# Patient Record
Sex: FEMALE | Race: WHITE | NOT HISPANIC OR LATINO | Employment: PART TIME | URBAN - NONMETROPOLITAN AREA
[De-identification: names, ages, dates, MRNs, and addresses within clinical notes are randomized per-mention and may not be internally consistent; named-entity substitution may affect disease eponyms.]

---
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
## 2025-01-14 ENCOUNTER — MYC MEDICAL ADVICE (OUTPATIENT)
Dept: FAMILY MEDICINE | Facility: OTHER | Age: 20
End: 2025-01-14
Payer: COMMERCIAL

## 2025-01-14 NOTE — TELEPHONE ENCOUNTER
Message below came in as CRM Request:  ===View-only below this line===      ----- Message -----       From:Kelly Phelps       Sent:1/11/2025  7:14 AM CST         To:Patient Customer Service Request Message List    Subject:Immunization     For my work I order to not wear a mask       ----- Message -----       From:Kelly Phelps       Sent:1/11/2025  7:12 AM CST         To:Customer Service    Subject:Immunization     Topic: Non-Medical Question.    Is there any way I can get a letter stating with my name on it that I got the flu vaccine. In my immunizations it doesn t state my name when looking at it.  Ty Alfaro RN on 1/14/2025 at 9:34 AM

## 2025-01-14 NOTE — TELEPHONE ENCOUNTER
Pt needs letter with her immunizations.     Pended note.     Routing to provider to review and respond.  Ty Alfaro RN on 1/14/2025 at 9:37 AM

## 2025-01-14 NOTE — LETTER
[facility]  [address]  [place] [address]  [phone]            1/14/2025      To Whom it May Concern:     Kelly Phelps, female, 2005, is a patient of mine and her immunizations are up to date:    Immunization History   Administered Date(s) Administered    DTAP (<7y) 04/26/2007    DTAP-IPV, <7Y (QUADRACEL/KINRIX) 10/17/2011    DTaP/HepB/IPV 02/14/2006, 04/17/2006, 06/12/2006    HEPA 12/21/2006, 01/14/2008    HEPATITIS A (PEDS 12M-18Y) 12/21/2006, 01/14/2008    HIB (PRP-T) 02/14/2006, 04/17/2006, 12/21/2006    HIB(PRP-OMP)(PedvaxHIB) 02/14/2006, 02/14/2006, 04/17/2006, 04/17/2006, 12/21/2006, 12/21/2006    HPV 09/17/2018    HPV9 12/11/2017, 09/17/2018    HepB 2005    Hepatitis B Not Indicated - By Hx 2005    Hepatitis B, Peds 2005    Influenza (prior to 2024) 11/03/2017    Influenza Intranasal Vaccine 12/04/2015    Influenza Vaccine >6 months,quad, PF 10/27/2016, 11/19/2019, 11/02/2020, 12/22/2022, 11/22/2023    Influenza Vaccine, 6+MO IM (QUADRIVALENT W/PRESERVATIVES) 10/27/2016    Influenza, Split Virus, Trivalent, Pf (Fluzone\Fluarix) 12/13/2024    MENINGOCOCCAL ACWY (MENQUADFI ) 12/22/2022    MMR 12/21/2006, 10/17/2011    MMR/V 12/21/2006    Meningococcal ACWY (Menactra ) 12/11/2017    Nasal Influenza Vaccine 2-49 (FluMist) 12/04/2015    Pneumococcal (PCV 7) 02/14/2006, 04/17/2006, 06/12/2006, 04/26/2007    Pneumococcal, Unspecified 02/14/2006, 04/17/2006, 06/12/2006, 04/26/2007    TDAP Vaccine (Adacel) 12/11/2017    TDAP Vaccine (Boostrix) 12/11/2017    Varicella 12/21/2006, 10/17/2011       Please contact me for questions or concerns at [phone].    Sincerely,        Leilani Kang PA-C

## 2025-01-14 NOTE — TELEPHONE ENCOUNTER
[facility]  Leilani Kang PA-C on 1/14/2025 at 9:56 AM

## 2025-01-28 ENCOUNTER — OFFICE VISIT (OUTPATIENT)
Dept: PSYCHIATRY | Facility: OTHER | Age: 20
End: 2025-01-28
Attending: PSYCHIATRY & NEUROLOGY

## 2025-01-28 VITALS
TEMPERATURE: 98.4 F | DIASTOLIC BLOOD PRESSURE: 79 MMHG | SYSTOLIC BLOOD PRESSURE: 119 MMHG | WEIGHT: 191 LBS | BODY MASS INDEX: 33.84 KG/M2 | OXYGEN SATURATION: 98 % | HEIGHT: 63 IN | HEART RATE: 78 BPM | RESPIRATION RATE: 20 BRPM

## 2025-01-28 DIAGNOSIS — F90.9 ATTENTION DEFICIT HYPERACTIVITY DISORDER (ADHD), UNSPECIFIED ADHD TYPE: Primary | ICD-10-CM

## 2025-01-28 PROCEDURE — G0463 HOSPITAL OUTPT CLINIC VISIT: HCPCS

## 2025-01-28 RX ORDER — DEXTROAMPHETAMINE SACCHARATE, AMPHETAMINE ASPARTATE, DEXTROAMPHETAMINE SULFATE AND AMPHETAMINE SULFATE 5; 5; 5; 5 MG/1; MG/1; MG/1; MG/1
20 TABLET ORAL 2 TIMES DAILY
Qty: 60 TABLET | Refills: 0 | Status: SHIPPED | OUTPATIENT
Start: 2025-02-27 | End: 2025-03-29

## 2025-01-28 RX ORDER — DEXTROAMPHETAMINE SACCHARATE, AMPHETAMINE ASPARTATE, DEXTROAMPHETAMINE SULFATE AND AMPHETAMINE SULFATE 5; 5; 5; 5 MG/1; MG/1; MG/1; MG/1
20 TABLET ORAL 2 TIMES DAILY
Qty: 60 TABLET | Refills: 0 | Status: SHIPPED | OUTPATIENT
Start: 2025-03-29 | End: 2025-04-28

## 2025-01-28 RX ORDER — DEXTROAMPHETAMINE SACCHARATE, AMPHETAMINE ASPARTATE, DEXTROAMPHETAMINE SULFATE AND AMPHETAMINE SULFATE 5; 5; 5; 5 MG/1; MG/1; MG/1; MG/1
20 TABLET ORAL 2 TIMES DAILY
Qty: 60 TABLET | Refills: 0 | Status: SHIPPED | OUTPATIENT
Start: 2025-01-28 | End: 2025-02-27

## 2025-01-28 ASSESSMENT — ANXIETY QUESTIONNAIRES
GAD7 TOTAL SCORE: 1
3. WORRYING TOO MUCH ABOUT DIFFERENT THINGS: NOT AT ALL
1. FEELING NERVOUS, ANXIOUS, OR ON EDGE: SEVERAL DAYS
7. FEELING AFRAID AS IF SOMETHING AWFUL MIGHT HAPPEN: NOT AT ALL
IF YOU CHECKED OFF ANY PROBLEMS ON THIS QUESTIONNAIRE, HOW DIFFICULT HAVE THESE PROBLEMS MADE IT FOR YOU TO DO YOUR WORK, TAKE CARE OF THINGS AT HOME, OR GET ALONG WITH OTHER PEOPLE: NOT DIFFICULT AT ALL
6. BECOMING EASILY ANNOYED OR IRRITABLE: NOT AT ALL
GAD7 TOTAL SCORE: 1
8. IF YOU CHECKED OFF ANY PROBLEMS, HOW DIFFICULT HAVE THESE MADE IT FOR YOU TO DO YOUR WORK, TAKE CARE OF THINGS AT HOME, OR GET ALONG WITH OTHER PEOPLE?: NOT DIFFICULT AT ALL
4. TROUBLE RELAXING: NOT AT ALL
5. BEING SO RESTLESS THAT IT IS HARD TO SIT STILL: NOT AT ALL
GAD7 TOTAL SCORE: 1
7. FEELING AFRAID AS IF SOMETHING AWFUL MIGHT HAPPEN: NOT AT ALL
2. NOT BEING ABLE TO STOP OR CONTROL WORRYING: NOT AT ALL

## 2025-01-28 ASSESSMENT — PATIENT HEALTH QUESTIONNAIRE - PHQ9
10. IF YOU CHECKED OFF ANY PROBLEMS, HOW DIFFICULT HAVE THESE PROBLEMS MADE IT FOR YOU TO DO YOUR WORK, TAKE CARE OF THINGS AT HOME, OR GET ALONG WITH OTHER PEOPLE: NOT DIFFICULT AT ALL
SUM OF ALL RESPONSES TO PHQ QUESTIONS 1-9: 2
SUM OF ALL RESPONSES TO PHQ QUESTIONS 1-9: 2

## 2025-01-28 ASSESSMENT — PAIN SCALES - GENERAL: PAINLEVEL_OUTOF10: NO PAIN (0)

## 2025-01-28 NOTE — PROGRESS NOTES
Psychiatric Progress Note/Visit  January 28, 2025    Identifying Data:  This is a 19-year-old woman seen for psychiatric medication management treatment of depression, anxiety and ADHD    Interval History:  She was seen most recently on October 28, 2024.  At that time she reported that the Adderall was very effective and she used it once a day or twice a day or sometimes she did not need to use it.  She found that it was effective no matter how she used it.  She reported that she had increased her Zoloft to 75 mg a day and noted that anxiety had decreased significantly.  We did not make any medication changes.    Today she reports that she continues to do well, going to school and working.  She reports that 20 mg of Adderall at a time seems to be the most effective dosage for her.    Mental Status Exam:  Anxiety continues to decrease and is considered minimal to absent.  Remainder of MSE is essentially unchanged from October 28, 2024.    Current Psychiatric Medications:  Immediate release Adderall 20 mg once or twice a day (increased dosage from 10 mg starting today)  Zoloft 75 mg every night  Lamictal 200 mg every night  Hydroxyzine tablets 25 to 50 mg as needed up to 3 times a day    Lab Tests/Results:  Laboratory studies were not ordered previously and none are being ordered today.    Diagnosis:  Depression, unspecified  Anxiety, unspecified  ADHD, unspecified  Rule out mood disorder    Impression/Assessment:  Kelly continues to do very well on her current combination of psychiatric medications.  We discussed increasing her regular dosage of immediate release Adderall to 20 mg once or twice a day.    Plan:  Increase immediate release Adderall to 20 mg once or twice a day for ADHD  Continue Zoloft 75 mg every night for depression and anxiety  Continue Lamictal 200 mg every night for depression  Continue individual psychotherapy  Follow-up Dr. Larios in 3 months    Time spent on day of visit 35 minutes - 7  minutes (12:59 PM through 1:06 PM) review of EMR prior to visit, 20 minutes (3:32 PM through 3:52 PM) face-to-face meeting with patient, including discussion of risk/benefits, alternatives and possible side effects to medication, counseling on above issues, and prescription of medications in the EMR, 8 minutes (3:52 PM through 4 PM) documentation in the EMR      Jose Larios MD    Answers submitted by the patient for this visit:  Patient Health Questionnaire (Submitted on 1/28/2025)  If you checked off any problems, how difficult have these problems made it for you to do your work, take care of things at home, or get along with other people?: Not difficult at all  PHQ9 TOTAL SCORE: 2  Patient Health Questionnaire (G7) (Submitted on 1/28/2025)  LOYD 7 TOTAL SCORE: 1

## 2025-01-28 NOTE — NURSING NOTE
"Chief Complaint   Patient presents with    Recheck Medication       Initial /79 (BP Location: Right arm, Patient Position: Sitting, Cuff Size: Adult Large)   Pulse 78   Temp 98.4  F (36.9  C) (Tympanic)   Resp 20   Ht 1.6 m (5' 3\")   Wt 86.6 kg (191 lb)   SpO2 98%   BMI 33.83 kg/m   Estimated body mass index is 33.83 kg/m  as calculated from the following:    Height as of this encounter: 1.6 m (5' 3\").    Weight as of this encounter: 86.6 kg (191 lb).  Medication Review: complete    The next two questions are to help us understand your food security.  If you are feeling you need any assistance in this area, we have resources available to support you today.          1/28/2025   SDOH- Food Insecurity   Within the past 12 months, did you worry that your food would run out before you got money to buy more? N   Within the past 12 months, did the food you bought just not last and you didn t have money to get more? N             Cristiano Martinez      "

## 2025-03-03 ENCOUNTER — MYC MEDICAL ADVICE (OUTPATIENT)
Dept: FAMILY MEDICINE | Facility: OTHER | Age: 20
End: 2025-03-03

## 2025-03-03 NOTE — TELEPHONE ENCOUNTER
Message below came in as CRM Request:    ----- Message -----       From:Kelly Phelps       Sent:3/2/2025  7:47 PM CST         To:Customer Service    Subject:Upcoming appointment     Topic: Non-Medical Question.    How can i cancel an appointment without calling.     Ty Alfaro RN on 3/3/2025 at 2:25 PM

## 2025-03-09 ENCOUNTER — HEALTH MAINTENANCE LETTER (OUTPATIENT)
Age: 20
End: 2025-03-09

## 2025-04-07 ENCOUNTER — HOSPITAL ENCOUNTER (OUTPATIENT)
Dept: GENERAL RADIOLOGY | Facility: OTHER | Age: 20
Discharge: HOME | End: 2025-04-07
Attending: NURSE PRACTITIONER

## 2025-04-07 ENCOUNTER — OFFICE VISIT (OUTPATIENT)
Dept: FAMILY MEDICINE | Facility: OTHER | Age: 20
End: 2025-04-07
Attending: STUDENT IN AN ORGANIZED HEALTH CARE EDUCATION/TRAINING PROGRAM

## 2025-04-07 VITALS
DIASTOLIC BLOOD PRESSURE: 78 MMHG | WEIGHT: 186.6 LBS | SYSTOLIC BLOOD PRESSURE: 132 MMHG | TEMPERATURE: 96.9 F | RESPIRATION RATE: 18 BRPM | HEART RATE: 63 BPM | OXYGEN SATURATION: 100 % | BODY MASS INDEX: 33.06 KG/M2 | HEIGHT: 63 IN
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
## 2025-04-07 DIAGNOSIS — M25.531 RIGHT WRIST PAIN: Primary | ICD-10-CM

## 2025-04-07 PROCEDURE — 73110 X-RAY EXAM OF WRIST: CPT | Mod: RT

## 2025-04-07 PROCEDURE — 99213 OFFICE O/P EST LOW 20 MIN: CPT | Performed by: NURSE PRACTITIONER

## 2025-04-07 ASSESSMENT — ENCOUNTER SYMPTOMS
CONSTITUTIONAL NEGATIVE: 1
EYES NEGATIVE: 1
ENDOCRINE NEGATIVE: 1
RESPIRATORY NEGATIVE: 1
JOINT SWELLING: 1
NEUROLOGICAL NEGATIVE: 1
CARDIOVASCULAR NEGATIVE: 1
HEMATOLOGIC/LYMPHATIC NEGATIVE: 1
PSYCHIATRIC NEGATIVE: 1
GASTROINTESTINAL NEGATIVE: 1

## 2025-04-07 ASSESSMENT — PAIN SCALES - GENERAL: PAINLEVEL_OUTOF10: MODERATE PAIN (6)

## 2025-04-07 NOTE — NURSING NOTE
"Chief Complaint   Patient presents with    Wrist Injury     Right, fell this am   Patient presents to the rapid clinic today for concerns of a right wrist injury. Patient states she fell this morning on her wrist and has been hurting since.     Initial /78 (BP Location: Right arm, Patient Position: Sitting, Cuff Size: Adult Regular)   Pulse 63   Temp 96.9  F (36.1  C) (Temporal)   Resp 18   Ht 1.6 m (5' 3\")   Wt 84.6 kg (186 lb 9.6 oz)   SpO2 100%   BMI 33.05 kg/m   Estimated body mass index is 33.05 kg/m  as calculated from the following:    Height as of this encounter: 1.6 m (5' 3\").    Weight as of this encounter: 84.6 kg (186 lb 9.6 oz).  Medication Review: complete    The next two questions are to help us understand your food security.  If you are feeling you need any assistance in this area, we have resources available to support you today.          4/7/2025   SDOH- Food Insecurity   Within the past 12 months, did you worry that your food would run out before you got money to buy more? N   Within the past 12 months, did the food you bought just not last and you didn t have money to get more? N         Health Care Directive:  Patient does not have a Health Care Directive: Discussed advance care planning with patient; however, patient declined at this time.    Yoan Andrews      "

## 2025-04-07 NOTE — PROGRESS NOTES
Kelly Phelps  2005    ASSESSMENT/PLAN      Presents to [facility] clinic with right wrist pain after fall this morning.  Patient states she slipped and fell this morning landing on her right wrist.  Patient has had no numbness or tingling.  Patient does have range of motion with increased pain.  X-ray obtained and shows no acute fracture or abnormality.  Patient's vitals are stable and she appears nontoxic.        1. Right wrist pain (Primary)  2. Fall, initial encounter    - XR Wrist Right G/E 3 Views  - RICE  - Ace wrap applied in clinic  - May use over-the-counter Tylenol or ibuprofen PRN  - Follow up as needed for new or worsening symptoms        *A shared decision making model was used.   *Patient and/or associated parties understood and were agreeable to treatment plan.   *Plan and all results were discussed.   *Explanation of diagnosis, treatment options and risk and benefits of medications reviewed with patient.    *Time was taken to answer all questions.   *Red flags symptoms were discussed and patient was advised when they should return for reevaluation or for prompt emergency evaluation.   *Patient was given verbal and written instructions on plan of care. Instructions were printed or are available on Mychart on electronic AVS.   *We discussed potential side effects of any prescribed or recommended therapies, as well as expectations for response to treatments.  *Patient discharged in stable condition    Karissa Sandoval CNP  [facility]    SUBJECTIVE  CHIEF COMPLAINT/ REASON FOR VISIT  Patient presents with:  Wrist Injury: Right, fell this am     HISTORY OF PRESENT ILLNESS  Kelly Phelps is a pleasant 19 year old female presents to rapid clinic today with right wrist pain after fall this morning.  Patient states she slipped and fell this morning landing on her right wrist.  Patient has had no numbness or tingling.  Patient does have range of motion with increased pain.       I  "have reviewed the nursing notes.  I have reviewed allergies, medication list, problem list, and past medical history.    REVIEW OF SYSTEMS  Review of Systems   Constitutional: Negative.    HENT: Negative.     Eyes: Negative.    Respiratory: Negative.     Cardiovascular: Negative.    Gastrointestinal: Negative.    Endocrine: Negative.    Genitourinary: Negative.    Musculoskeletal:  Positive for joint swelling.   Skin: Negative.    Neurological: Negative.    Hematological: Negative.    Psychiatric/Behavioral: Negative.     All other systems reviewed and are negative.       VITAL SIGNS  Vitals:    04/07/25 1220   BP: 132/78   BP Location: Right arm   Patient Position: Sitting   Cuff Size: Adult Regular   Pulse: 63   Resp: 18   Temp: 96.9  F (36.1  C)   TempSrc: Temporal   SpO2: 100%   Weight: 84.6 kg (186 lb 9.6 oz)   Height: 1.6 m (5' 3\")      Body mass index is 33.05 kg/m .      OBJECTIVE  PHYSICAL EXAM  Physical Exam  Vitals and nursing note reviewed.   Constitutional:       Appearance: Normal appearance.   HENT:      Head: Normocephalic.      Nose: Nose normal.      Mouth/Throat:      Mouth: Mucous membranes are moist.   Cardiovascular:      Rate and Rhythm: Normal rate.      Pulses: Normal pulses.   Pulmonary:      Effort: Pulmonary effort is normal.   Abdominal:      Palpations: Abdomen is soft.   Musculoskeletal:         General: Tenderness present. No swelling, deformity or signs of injury. Normal range of motion.   Skin:     General: Skin is warm and dry.      Capillary Refill: Capillary refill takes less than 2 seconds.   Neurological:      General: No focal deficit present.      Mental Status: She is alert.            DIAGNOSTICS  Results for orders placed or performed in visit on 04/07/25   XR Wrist Right G/E 3 Views     Status: None    Narrative    PROCEDURE:  XR WRIST RIGHT G/E 3 VIEWS    HISTORY: Right wrist pain; Fall, initial encounter    COMPARISON: No relevant priors available for " comparison    TECHNIQUE:  XR WRIST RIGHT 3 VIEWS    FINDINGS:    No acute fracture or dislocation is identified. No suspicious osseous  lesion. The joint spaces are preserved. Maturation and mineralization  is within normal limits.     No foreign body or subcutaneous emphysema.       Impression    IMPRESSION:    No acute osseous abnormality.    HETAL SUAREZ MD         SYSTEM ID:  [id number]

## 2025-05-19 ENCOUNTER — MYC REFILL (OUTPATIENT)
Dept: FAMILY MEDICINE | Facility: OTHER | Age: 20
End: 2025-05-19

## 2025-05-19 ENCOUNTER — MYC REFILL (OUTPATIENT)
Dept: PSYCHIATRY | Facility: OTHER | Age: 20
End: 2025-05-19

## 2025-05-19 DIAGNOSIS — L30.1 DYSHIDROTIC ECZEMA: ICD-10-CM

## 2025-05-19 DIAGNOSIS — F41.9 MILD ANXIETY: ICD-10-CM

## 2025-05-19 DIAGNOSIS — F33.0 MILD RECURRENT MAJOR DEPRESSION: ICD-10-CM

## 2025-05-19 DIAGNOSIS — L30.9 ECZEMA, UNSPECIFIED TYPE: ICD-10-CM

## 2025-05-19 RX ORDER — LAMOTRIGINE 200 MG/1
200 TABLET ORAL AT BEDTIME
Qty: 30 TABLET | Refills: 0 | Status: SHIPPED | OUTPATIENT
Start: 2025-05-19

## 2025-05-22 RX ORDER — TRIAMCINOLONE ACETONIDE 1 MG/G
CREAM TOPICAL 2 TIMES DAILY
Qty: 80 G | Refills: 0 | Status: SHIPPED | OUTPATIENT
Start: 2025-05-22

## 2025-05-22 NOTE — TELEPHONE ENCOUNTER
GI sent Rx request for the following:      Requested Prescriptions   Pending Prescriptions Disp Refills    triamcinolone (KENALOG) 0.1 % external cream 80 g 1     Sig: Apply topically 2 times daily.       Topical Steroids and Nonsteroidals Protocol Passed - 5/22/2025  7:13 AM       Last Prescription Date:   7/23/24  Last Fill Qty/Refills:         80 g, R-1    Last Office Visit:              1/8/24 diagnosis last addressed   Future Office visit:              Will route to unit 4 scheduling to call patient and help assist in scheduling appointment.  Patient due for annual review  Routing refill request to provider for review/approval   Yasmin Golden RN on 5/22/2025 at 7:17 AM

## 2025-06-03 ENCOUNTER — PATIENT OUTREACH (OUTPATIENT)
Dept: FAMILY MEDICINE | Facility: OTHER | Age: 20
End: 2025-06-03

## 2025-06-03 NOTE — LETTER
[facility]  [address]  [place] [id number] [phone]       Nicolette 3, 2025    Kelly Phelps  [address] [address] [address]    Dear Kelly,    We care about your health and have reviewed your health plan and are making recommendations based on this review, to optimize your health.    You are in particular need of attention regarding:  -Wellness (Physical) Visit     We are recommending that you:  -schedule a WELLNESS (Physical) APPOINTMENT with me.        In addition, here is a list of due or overdue Health Maintenance reminders.    Health Maintenance Due   Topic Date Due    Depression Action Plan  Never done    Meningitis B Vaccine (1 of 2 - Standard) Never done    Yearly Preventive Visit  12/22/2023    Chlamydia Screening  08/24/2024    COVID-19 Vaccine (1 - 2024-25 season) Never done       To address the above recommendations, we encourage you to contact us at [phone]. They will assist you with finding the most convenient time and location.    Thank you for trusting [facility] and we appreciate the opportunity to serve you.  We look forward to supporting your healthcare needs in the future.    Healthy Regards,    Your [facility] Team

## 2025-06-03 NOTE — TELEPHONE ENCOUNTER
Patient Quality Outreach    Patient is due for the following:   Physical Preventive Adult Physical    Action(s) Taken:   Schedule a Adult Preventative    Type of outreach:    Sent letter.    Questions for provider review:    None         Sean Mendez  Chart routed to None.

## 2025-07-05 ENCOUNTER — MYC REFILL (OUTPATIENT)
Dept: PSYCHIATRY | Facility: OTHER | Age: 20
End: 2025-07-05

## 2025-07-05 DIAGNOSIS — F33.0 MILD RECURRENT MAJOR DEPRESSION: ICD-10-CM

## 2025-07-05 DIAGNOSIS — F41.9 MILD ANXIETY: ICD-10-CM

## 2025-07-07 RX ORDER — LAMOTRIGINE 200 MG/1
200 TABLET ORAL AT BEDTIME
Qty: 15 TABLET | Refills: 1 | Status: SHIPPED | OUTPATIENT
Start: 2025-07-07

## 2025-07-23 ENCOUNTER — VIRTUAL VISIT (OUTPATIENT)
Dept: PSYCHIATRY | Facility: OTHER | Age: 20
End: 2025-07-23
Attending: PSYCHIATRY & NEUROLOGY

## 2025-07-23 DIAGNOSIS — F90.9 ATTENTION DEFICIT HYPERACTIVITY DISORDER (ADHD), UNSPECIFIED ADHD TYPE: Primary | ICD-10-CM

## 2025-07-23 DIAGNOSIS — F41.9 MILD ANXIETY: ICD-10-CM

## 2025-07-23 DIAGNOSIS — F33.0 MILD RECURRENT MAJOR DEPRESSION: ICD-10-CM

## 2025-07-23 RX ORDER — DEXTROAMPHETAMINE SACCHARATE, AMPHETAMINE ASPARTATE, DEXTROAMPHETAMINE SULFATE AND AMPHETAMINE SULFATE 5; 5; 5; 5 MG/1; MG/1; MG/1; MG/1
20 TABLET ORAL 2 TIMES DAILY
Qty: 60 TABLET | Refills: 0 | Status: SHIPPED | OUTPATIENT
Start: 2025-07-23 | End: 2025-08-22

## 2025-07-23 RX ORDER — DEXTROAMPHETAMINE SACCHARATE, AMPHETAMINE ASPARTATE, DEXTROAMPHETAMINE SULFATE AND AMPHETAMINE SULFATE 5; 5; 5; 5 MG/1; MG/1; MG/1; MG/1
20 TABLET ORAL 2 TIMES DAILY
Qty: 60 TABLET | Refills: 0 | Status: SHIPPED | OUTPATIENT
Start: 2025-08-22 | End: 2025-09-21

## 2025-07-23 RX ORDER — HYDROXYZINE HYDROCHLORIDE 10 MG/1
20 TABLET, FILM COATED ORAL 3 TIMES DAILY PRN
Qty: 180 TABLET | Refills: 1 | Status: SHIPPED | OUTPATIENT
Start: 2025-07-23

## 2025-07-23 RX ORDER — LAMOTRIGINE 200 MG/1
200 TABLET ORAL AT BEDTIME
Qty: 90 TABLET | Refills: 1 | Status: SHIPPED | OUTPATIENT
Start: 2025-07-23

## 2025-07-23 RX ORDER — DEXTROAMPHETAMINE SACCHARATE, AMPHETAMINE ASPARTATE, DEXTROAMPHETAMINE SULFATE AND AMPHETAMINE SULFATE 5; 5; 5; 5 MG/1; MG/1; MG/1; MG/1
20 TABLET ORAL 2 TIMES DAILY
Qty: 60 TABLET | Refills: 0 | Status: SHIPPED | OUTPATIENT
Start: 2025-09-21 | End: 2025-10-21

## 2025-07-23 ASSESSMENT — ANXIETY QUESTIONNAIRES
7. FEELING AFRAID AS IF SOMETHING AWFUL MIGHT HAPPEN: NOT AT ALL
8. IF YOU CHECKED OFF ANY PROBLEMS, HOW DIFFICULT HAVE THESE MADE IT FOR YOU TO DO YOUR WORK, TAKE CARE OF THINGS AT HOME, OR GET ALONG WITH OTHER PEOPLE?: NOT DIFFICULT AT ALL
4. TROUBLE RELAXING: SEVERAL DAYS
6. BECOMING EASILY ANNOYED OR IRRITABLE: SEVERAL DAYS
2. NOT BEING ABLE TO STOP OR CONTROL WORRYING: NOT AT ALL
7. FEELING AFRAID AS IF SOMETHING AWFUL MIGHT HAPPEN: NOT AT ALL
GAD7 TOTAL SCORE: 3
GAD7 TOTAL SCORE: 3
1. FEELING NERVOUS, ANXIOUS, OR ON EDGE: SEVERAL DAYS
IF YOU CHECKED OFF ANY PROBLEMS ON THIS QUESTIONNAIRE, HOW DIFFICULT HAVE THESE PROBLEMS MADE IT FOR YOU TO DO YOUR WORK, TAKE CARE OF THINGS AT HOME, OR GET ALONG WITH OTHER PEOPLE: NOT DIFFICULT AT ALL
5. BEING SO RESTLESS THAT IT IS HARD TO SIT STILL: NOT AT ALL
GAD7 TOTAL SCORE: 3
3. WORRYING TOO MUCH ABOUT DIFFERENT THINGS: NOT AT ALL

## 2025-07-23 ASSESSMENT — PATIENT HEALTH QUESTIONNAIRE - PHQ9
SUM OF ALL RESPONSES TO PHQ QUESTIONS 1-9: 3
10. IF YOU CHECKED OFF ANY PROBLEMS, HOW DIFFICULT HAVE THESE PROBLEMS MADE IT FOR YOU TO DO YOUR WORK, TAKE CARE OF THINGS AT HOME, OR GET ALONG WITH OTHER PEOPLE: NOT DIFFICULT AT ALL
SUM OF ALL RESPONSES TO PHQ QUESTIONS 1-9: 3

## 2025-07-23 NOTE — NURSING NOTE
"Chief Complaint   Patient presents with    Medication Therapy Management     Patient reports taking the following medication that was not in their medication list: dextroamphetamine 20mg.    Initial There were no vitals taken for this visit. Estimated body mass index is 33.05 kg/m  as calculated from the following:    Height as of 4/7/25: 1.6 m (5' 3\").    Weight as of 4/7/25: 84.6 kg (186 lb 9.6 oz).  Medication Review: complete    The next two questions are to help us understand your food security.  If you are feeling you need any assistance in this area, we have resources available to support you today.          7/23/2025   SDOH- Food Insecurity   Within the past 12 months, did you worry that your food would run out before you got money to buy more? N   Within the past 12 months, did the food you bought just not last and you didn t have money to get more? N           Cristiano Martinez      "

## 2025-07-23 NOTE — PROGRESS NOTES
Psychiatric Progress Note/Visit  July 23, 2025    Identifying Data:  This is a 19-year-old woman seen virtually for psychiatric medication management treatment of depression, anxiety and ADHD    Interval History:  She was seen most recently on January 28, 2025.  At that time she reported she was doing well and ambulating in school and work.  She noted that 20 mg of Adderall at bedtime seem to be the most effective dosage for her.  We did not make any medication changes.    Today she reports that she continues to do well on her current medications.  She does note that when she uses as needed hydroxyzine she will feel tired the next day if she takes it the night before.  We discussed a trial of a lower dosage, 10-20 mg at bedtime, to try to minimize sedative side effects.  She continues to do well with her schooling and work and reports she only has 1 year left of her college studies.  She says that she is now thinking about going into counseling and I supported that and encouraged her.  She reports that she continues to benefit from her individual psychotherapy sessions and has been able to decrease the frequency to once a month.    [place] PDMP reviewed today  Controlled substance use agreement sent to patient today    Mental Status Exam:  Anxiety continues to decrease and is considered essentially absent on psychiatric medication.  Remainder of the MSE is essentially unchanged from January 28, 2025.    Current Psychiatric Medications:  Immediate release Adderall 20 mg once or twice a day  Zoloft 75 mg every night  Lamictal 200 mg every night  Dosage adjustment as follows:  Hydroxyzine 10-20 mg up to 3 times a day as needed    Lab Tests/Results:  Laboratory studies were not ordered previously and noted being ordered today.    Diagnosis:  Depression, unspecified  Anxiety, unspecified  ADHD, unspecified  Rule out mood disorder    Impression/Assessment:  Kelly continues to do very well on her current psychiatric  medications.  She finds it 25 mg hydroxyzine can be too sedating, sometimes even the next morning.  We discussed a trial of 10-20 mg to decrease sedative side effect.    Plan:  Decrease hydroxyzine tablets to 10-20 mg as needed up to 3 times a day for anxiety or sleep  Continue immediate release Adderall 20 mg once or twice a day for ADHD  Continue Zoloft 75 mg every night for depression and anxiety  Continue Lamictal 200 mg every night for depression  Continue individual psychotherapy    Time spent on day of visit 34 minutes - 8 minutes (8:16 AM through 8:24 AM) review of EMR prior to visit, 18 minutes (2:47 PM through 3:05 PM) virtual meeting with patient, including discussion of risk/benefits, alternatives and possible side effects to medication, counseling on above issues, and prescription of medications in the EMR, 8 minutes (3:05 PM through 3:13 PM) documentation in the EMR      Jose Larios MD    Answers submitted by the patient for this visit:  Patient Health Questionnaire (Submitted on 7/23/2025)  If you checked off any problems, how difficult have these problems made it for you to do your work, take care of things at home, or get along with other people?: Not difficult at all  PHQ9 TOTAL SCORE: 3  Patient Health Questionnaire (G7) (Submitted on 7/23/2025)  LOYD 7 TOTAL SCORE: 3

## 2025-07-23 NOTE — COMMUNITY RESOURCES LIST (ENGLISH)
Housing  [employer]   Program Provider: [employer]  Program Website : https://www.housinglink.org/  Next Steps: Go to https://www.HOSTINGlink.org/    Program Locations:   Address:  [address] [place] [id number]   Distance:  136.92 mi   Office Phone Number: [phone]    Hours:   Monday: 8:00 AM - 5:00 PM   Tuesday: 8:00 AM - 5:00 PM   Wednesday: 8:00 AM - 5:00 PM   Thursday: 8:00 AM - 5:00 PM   Friday: 8:00 AM - 5:00 PM   Saturday: CLOSED   Sunday: CLOSED     Affordable Housing Online   Program Provider: [employer]  Program Website : [email or web address]/  Next Steps: Go to [email or web address]/    Program Locations:   Address:  [address] [place] [address]   Distance:  1088.1 mi     Hours:   Monday: 8:00 AM - 5:00 PM   Tuesday: 8:00 AM - 5:00 PM   Wednesday: 8:00 AM - 5:00 PM   Thursday: 8:00 AM - 5:00 PM   Friday: 8:00 AM - 5:00 PM   Saturday: CLOSED   Sunday: CLOSED

## 2025-07-23 NOTE — LETTER
[facility]  07/23/25  Patient: Kelly Phelps  YOB: 2005  Medical Record Number: [id number]                                                                                  Non-Opioid Controlled Substance Agreement    This is an agreement between you and your provider regarding safe and appropriate use of controlled substances prescribed by your care team. Controlled substances are?medicines that can cause physical and mental dependence (abuse).     There are strict laws about having and using these medicines. We here at [facility] are  committed to working with you in your efforts to get better. To support you in this work, we'll help you schedule regular office appointments for medicine refills. If we must cancel or change your appointment for any reason, we'll make sure you have enough medicine to last until your next appointment.     As a Provider, I will:   Listen carefully to your concerns while treating you with respect.   Recommend a treatment plan that I believe is in your best interest and may involve therapies other than medicine.    Talk with you often about the possible benefits and the risk of harm of any medicine that we prescribe for you.  Assess the safety of this medicine and check how well it works.    Provide a plan on how to taper (discontinue or go off) using this medicine if the decision is made to stop its use.      ::  As a Patient, I understand controlled substances:     Are prescribed by my care provider to help me function or work and manage my condition(s).?  Are strong medicines and can cause serious side effects.     Need to be taken exactly as prescribed.?Combining controlled substances with certain medicines or chemicals (such as illegal drugs, alcohol, sedatives, sleeping pills, and benzodiazepines) can be dangerous or even fatal.? If I stop taking my medicines suddenly, I may have severe withdrawal symptoms.     The risks, benefits, and  side effects of these medicine(s) were explained to me. I agree that:    I will take part in other treatments as advised by my care team. This may be psychiatry or counseling, physical therapy, behavioral therapy, group treatment or a referral to specialist.    I will keep all my appointments and understand this is part of the monitoring of controlled substances.?My care team may require an office visit for EVERY controlled substance refill. If I miss appointments or don t follow instructions, my care team may stop my medicine    I will take my medicines as prescribed. I will not change the dose or schedule unless my care team tells me to. There will be no refills if I run out early.      I may be asked to come to the clinic and complete a urine drug test or complete a pill count. If I don t give a urine sample or participate in a pill count, the care team may stop my medicine.    I will only receive controlled substance prescriptions from this clinic. If I am treated by another provider, I will tell them that I am taking controlled substances and that I have a treatment agreement with this provider. I will inform my [facility] care team within one business day if I am given a prescription for any controlled substance by another healthcare provider. My [facility] care team can contact other providers and pharmacists about my use of any medicines.    It is up to me to make sure that I don't run out of my medicines on weekends or holidays.?If my care team is willing to refill my prescription without a visit, I must request refills only during office hours. Refills may take up to 3 business days to process. I will use one pharmacy to fill all my controlled substance prescriptions. I will notify the clinic about any changes to my insurance or medicine availability.    I am responsible for my prescriptions. If the medicine/prescription is lost, stolen or destroyed, it will not be replaced.?I also agree not  to share controlled substance medicines with anyone.     I am aware I should not use any illegal or recreational drugs. I agree not to drink alcohol unless my care team says I can.     If I enroll in the [place] [facility] Cann[facility] program, I will tell my care team before my next refill.    I will tell my care team right away if I become pregnant, have a new medical problem treated outside of my regular clinic, or have a change in my medicines.     I understand that this medicine can affect my thinking, judgment and reaction time.? Alcohol and drugs affect the brain and body, which can affect the safety of my driving. Being under the influence of alcohol or drugs can affect my decision-making, behaviors, personal safety and the safety of others. Driving while impaired (DWI) can occur if a person is driving, operating or in physical control of a car, motorcycle, boat, snowmobile, ATV, motorbike, off-road vehicle or any other motor vehicle ([place] Statute 169A.20). I understand the risk if I choose to drive or operate any vehicle or machinery.    I understand that if I do not follow any of the conditions above, my prescriptions or treatment may be stopped or changed.   I agree that my provider, clinic care team and pharmacy may work with any city, state or federal law enforcement agency that investigates the misuse, sale or other diversion of my controlled medicine. I will allow my provider to discuss my care with, or share a copy of, this agreement with any other treating provider, pharmacy or emergency room where I receive care.     I have read this agreement and have asked questions about anything I did not understand.    ________________________________________________________  Patient Signature - Kelly WALLER Lenin     ___________________                   Date     ________________________________________________________  Provider Signature - Jose Larios MD       ___________________                   Date      ________________________________________________________  Witness Signature (required if provider not present while patient signing)          ___________________                   Date